# Patient Record
Sex: FEMALE | Race: WHITE | NOT HISPANIC OR LATINO | Employment: FULL TIME | URBAN - METROPOLITAN AREA
[De-identification: names, ages, dates, MRNs, and addresses within clinical notes are randomized per-mention and may not be internally consistent; named-entity substitution may affect disease eponyms.]

---

## 2018-03-25 ENCOUNTER — TRANSCRIBE ORDERS (OUTPATIENT)
Dept: ADMINISTRATIVE | Age: 63
End: 2018-03-25

## 2018-03-25 ENCOUNTER — APPOINTMENT (OUTPATIENT)
Dept: RADIOLOGY | Age: 63
End: 2018-03-25
Payer: COMMERCIAL

## 2018-03-25 DIAGNOSIS — Z01.818 PREOP EXAMINATION: Primary | ICD-10-CM

## 2018-03-25 DIAGNOSIS — Z01.818 PREOP EXAMINATION: ICD-10-CM

## 2018-03-25 PROCEDURE — 71046 X-RAY EXAM CHEST 2 VIEWS: CPT

## 2018-04-12 ENCOUNTER — HOSPITAL ENCOUNTER (OUTPATIENT)
Dept: NON INVASIVE DIAGNOSTICS | Facility: CLINIC | Age: 63
Discharge: HOME | End: 2018-04-12
Payer: COMMERCIAL

## 2018-04-12 DIAGNOSIS — Z01.818 PREOP EXAMINATION: ICD-10-CM

## 2018-04-12 PROCEDURE — 93306 TTE W/DOPPLER COMPLETE: CPT

## 2018-04-13 PROCEDURE — 93306 TTE W/DOPPLER COMPLETE: CPT | Performed by: INTERNAL MEDICINE

## 2021-03-01 DIAGNOSIS — Z23 ENCOUNTER FOR IMMUNIZATION: ICD-10-CM

## 2022-07-22 ENCOUNTER — EVALUATION (OUTPATIENT)
Dept: PHYSICAL THERAPY | Facility: OTHER | Age: 67
End: 2022-07-22
Payer: COMMERCIAL

## 2022-07-22 DIAGNOSIS — M79.606 PAIN OF LOWER EXTREMITY, UNSPECIFIED LATERALITY: ICD-10-CM

## 2022-07-22 PROCEDURE — 97161 PT EVAL LOW COMPLEX 20 MIN: CPT | Performed by: PHYSICAL THERAPIST

## 2022-07-22 PROCEDURE — 97110 THERAPEUTIC EXERCISES: CPT | Performed by: PHYSICAL THERAPIST

## 2022-07-22 NOTE — PROGRESS NOTES
PT Evaluation     Today's date: 2022  Patient name: Ez Ochoa  : 1955  MRN: [id number]  Referring provider: LOI Jansen  Dx:   Encounter Diagnosis     ICD-10-CM    1  Pain of lower extremity, unspecified laterality  M79 606 Ambulatory Referral to Physical Therapy                  Assessment  Assessment details: Ez Ochoa is a pleasant 77 y o  adult who presents with  B/L LE pain and tightness, she reported that after a std and walking gradually getting worst  This has started about 3 months ago  She likes to walk for exercise, some strength exercises  And some yoga  She has been trying to stretch the legs several tiems a week  No history of back pain  No joint pan mostly just stiffness  HEP issued and POC reviewed  Impairments: abnormal coordination, abnormal muscle firing, abnormal or restricted ROM, activity intolerance, impaired physical strength, lacks appropriate home exercise program, pain with function and poor body mechanics    Symptom irritability: moderateUnderstanding of Dx/Px/POC: good   Prognosis: good    Goals  Short Term:  Pt will report decreased levels of pain by at least 2 subjective ratings in 4 weeks  Pt will demonstrate improved ROM by at least 10 degrees in 4 weeks  Pt will demonstrate improved strength by 1/2 grade  Long Term:   Pt will be independent in their HEP in 8 weeks  Pt will be be pain free with IADL's  Pt will demonstrate improved FOTO, > 80         Plan  Plan details: Prognosis above is given PT services 2x/week tapering to 1x/week over the next 3 months and home program adherence    Patient would benefit from: skilled physical therapy  Planned modality interventions: thermotherapy: hydrocollator packs  Planned therapy interventions: activity modification, joint mobilization, manual therapy, motor coordination training, neuromuscular re-education, patient education, self care, therapeutic activities, therapeutic exercise, graded activity and home exercise program  Frequency: 2x week  Treatment plan discussed with: patient        Subjective Evaluation    Pain  At best pain ratin  At worst pain ratin  Location: stiffness rated     Patient Goals  Patient goals for therapy: decreased pain, independence with ADLs/IADLs, return to sport/leisure activities, increased motion and increased strength          Objective     General Comments:      Knee Comments  Special test                Hip/SI joint:   scour=   tightness     dennis =lemueltess      capsular mobility= -          long axis distraction (hip) =-         SLS=   -       obers=   -        piriformis test=-            P4 test=-             Gaenslen's test= -     Distraction=     -         Active SLR= -           Active SLR with stabilization =-            Leg length =     -          Sacral Thrust=-   anjel finger= - S/L si joint compression= -       LUMBAR tests:   SLR =   -slump= -PA mob= -    quadrant test=  traction=    -  prone instability test=     -          femoral nerve traction=  quad tightnesss   Repetitive testing: extension  =    mild  Stiff  flexion    = -  Lower extremity reflexes: -  Lower extremity myotomes:-  Sensation: no N/T     No changes bowl of bladder changes  no recent illness  ITB no TTP     Limited ROM B/ L hip and knee could be source of the stiffness                      Precautions: none       Manuals             IASTM B/L IT  NV                                                    Neuro Re-Ed             Cat camel              Quad alt LE              ER int owall              Bridge wit hER                                                     Ther Ex                                                                                                                     Ther Activity                                       Gait Training                                       Modalities

## 2022-07-22 NOTE — LETTER
2022    Guadalupe Fierro, [address] [facility] [address]    Patient: Faizan Tyson   YOB: 1955   Date of Visit: 2022     Encounter Diagnosis     ICD-10-CM    1  Pain of lower extremity, unspecified laterality  M79 606 Ambulatory Referral to Physical Therapy       Dear Dr Angel Coker:    Thank you for your recent referral of Faizan Tyson  Please review the attached evaluation summary from Magaly's recent visit  Please verify that you agree with the plan of care by signing the attached order  If you have any questions or concerns, please do not hesitate to call  I sincerely appreciate the opportunity to share in the care of one of your patients and hope to have another opportunity to work with you in the near future  Sincerely,    Terrence Huang, PT      Referring Provider:      I certify that I have read the below Plan of Care and certify the need for these services furnished under this plan of treatment while under my care  [address]upe Fierro [address] [facility] [address] [facility] [address]  [place] [place]          PT Evaluation     Today's date: 2022  Patient name: Faizan Tyson  : 1955  MRN: [id number]  Referring provider: LOI Fairbanks  Dx:   Encounter Diagnosis     ICD-10-CM    1  Pain of lower extremity, unspecified laterality  M79 606 Ambulatory Referral to Physical Therapy                  Assessment  Assessment details: Faizan Tyson is a pleasant 77 y o  adult who presents with  B/L LE pain and tightness, she reported that after a std and walking gradually getting worst  This has started about 3 months ago  She likes to walk for exercise, some strength exercises  And some yoga  She has been trying to stretch the legs several tiems a week  No history of back pain  No joint pan mostly just stiffness  HEP issued and POC reviewed       Impairments: abnormal coordination, abnormal muscle firing, abnormal or restricted ROM, activity intolerance, impaired physical strength, lacks appropriate home exercise program, pain with function and poor body mechanics    Symptom irritability: moderateUnderstanding of Dx/Px/POC: good   Prognosis: good    Goals  Short Term:  Pt will report decreased levels of pain by at least 2 subjective ratings in 4 weeks  Pt will demonstrate improved ROM by at least 10 degrees in 4 weeks  Pt will demonstrate improved strength by 1/2 grade  Long Term:   Pt will be independent in their HEP in 8 weeks  Pt will be be pain free with IADL's  Pt will demonstrate improved FOTO, > 80         Plan  Plan details: Prognosis above is given PT services 2x/week tapering to 1x/week over the next 3 months and home program adherence    Patient would benefit from: skilled physical therapy  Planned modality interventions: thermotherapy: hydrocollator packs  Planned therapy interventions: activity modification, joint mobilization, manual therapy, motor coordination training, neuromuscular re-education, patient education, self care, therapeutic activities, therapeutic exercise, graded activity and home exercise program  Frequency: 2x week  Treatment plan discussed with: patient        Subjective Evaluation    Pain  At best pain ratin  At worst pain ratin  Location: stiffness rated     Patient Goals  Patient goals for therapy: decreased pain, independence with ADLs/IADLs, return to sport/leisure activities, increased motion and increased strength          Objective     General Comments:      Knee Comments  Special test                Hip/SI joint:   scour=   tightness     dennis =tighntess      capsular mobility= -          long axis distraction (hip) =-         SLS=   -       obers=   -        piriformis test=-            P4 test=-             Gaenslen's test= -     Distraction=     -         Active SLR= -           Active SLR with stabilization =-            Leg length =     -          Sacral Thrust=-   anjel finger= - S/L si joint compression= -       LUMBAR tests:   SLR =   -slump= -PA mob= -    quadrant test=  traction=    -  prone instability test=     -          femoral nerve traction=  quad tightnesss   Repetitive testing: extension  =    mild  Stiff  flexion    = -  Lower extremity reflexes: -  Lower extremity myotomes:-  Sensation: no N/T     No changes bowl of bladder changes  no recent illness  ITB no TTP     Limited ROM B/ L hip and knee could be source of the stiffness                      Precautions: none       Manuals             IASTM B/L IT  NV                                                    Neuro Re-Ed             Cat camel              Quad alt LE              ER int owall              Bridge wit hER                                                     Ther Ex                                                                                                                     Ther Activity                                       Gait Training                                       Modalities

## 2022-08-05 ENCOUNTER — OFFICE VISIT (OUTPATIENT)
Dept: PHYSICAL THERAPY | Facility: OTHER | Age: 67
End: 2022-08-05
Payer: COMMERCIAL

## 2022-08-05 DIAGNOSIS — M79.606 PAIN OF LOWER EXTREMITY, UNSPECIFIED LATERALITY: Primary | ICD-10-CM

## 2022-08-05 PROCEDURE — 97110 THERAPEUTIC EXERCISES: CPT | Performed by: PHYSICAL THERAPIST

## 2022-08-05 PROCEDURE — 97140 MANUAL THERAPY 1/> REGIONS: CPT | Performed by: PHYSICAL THERAPIST

## 2022-08-05 PROCEDURE — 97112 NEUROMUSCULAR REEDUCATION: CPT | Performed by: PHYSICAL THERAPIST

## 2022-08-05 NOTE — PROGRESS NOTES
Daily Note     Today's date: 2022  Patient name: Pablo Recinos  : 1955  MRN: [id number]  Referring provider: LOI Gunter  Dx:   Encounter Diagnosis     ICD-10-CM    1  Pain of lower extremity, unspecified laterality  M79 606                   Subjective:  Progressing well minimal pain today, tolerated ISTM well  Objective: See treatment diary below      Assessment: Tolerated treatment well  Patient demonstrated fatigue post treatment       Plan: Continue per plan of care        Precautions: none       Manuals 8 5            IASTM B/L IT  MJ                                                    Neuro Re-Ed             Cat camel              Quad alt LE              TB std kicks  20 each             Bridge wit hER              RDL #5 to step 10x2             X band walk 3 laps             biodex balance  NV             Ther Ex             Bike  6min             gsatroc stretch  10''x10             Er into wall 5''x10                                                                              Ther Activity                                       Gait Training                                       Modalities

## 2022-08-19 ENCOUNTER — OFFICE VISIT (OUTPATIENT)
Dept: PHYSICAL THERAPY | Facility: OTHER | Age: 67
End: 2022-08-19
Payer: COMMERCIAL

## 2022-08-19 DIAGNOSIS — M79.606 PAIN OF LOWER EXTREMITY, UNSPECIFIED LATERALITY: Primary | ICD-10-CM

## 2022-08-19 PROCEDURE — 97140 MANUAL THERAPY 1/> REGIONS: CPT | Performed by: PHYSICAL THERAPIST

## 2022-08-19 PROCEDURE — 97112 NEUROMUSCULAR REEDUCATION: CPT | Performed by: PHYSICAL THERAPIST

## 2022-08-19 PROCEDURE — 97110 THERAPEUTIC EXERCISES: CPT | Performed by: PHYSICAL THERAPIST

## 2022-08-19 NOTE — PROGRESS NOTES
Daily Note     Today's date: 2022  Patient name: Lyric Dumont  : 1955  MRN: [id number]  Referring provider: LOI Cheng  Dx:   No diagnosis found  Subjective:   patient is progressing very well she is able to walk > 1 mile no cramping or pain  HEP was updated  We will tenitilvey D/C from PT at this time  Since we are meeting the majority of the goals set at this time  Objective: See treatment diary below      Assessment: Tolerated treatment well  Patient demonstrated fatigue post treatment       Plan: Continue per plan of care        Precautions: none       Manuals 8 5            IASTM B/L IT  MJ                                                    Neuro Re-Ed             Cat camel              Quad alt LE              TB std kicks  20 each             Bridge wit hER   With kick 10x2            RDL #5 to step 10x2             X band walk 3 laps             biodex balance  NV  Los 3x            Ther Ex             Bike  6min             gsatroc stretch  10''x10             Er into wall 5''x10                                                                              Ther Activity                                       Gait Training                                       Modalities

## 2024-06-21 ENCOUNTER — HOSPITAL ENCOUNTER (OUTPATIENT)
Facility: HOSPITAL | Age: 69
End: 2024-06-21
Payer: MEDICARE

## 2024-06-21 DIAGNOSIS — Z12.31 ENCOUNTER FOR SCREENING MAMMOGRAM FOR MALIGNANT NEOPLASM OF BREAST: ICD-10-CM

## 2024-06-21 PROCEDURE — 77067 SCR MAMMO BI INCL CAD: CPT

## 2024-06-21 PROCEDURE — 77063 BREAST TOMOSYNTHESIS BI: CPT

## 2024-08-22 ENCOUNTER — HOSPITAL ENCOUNTER (OUTPATIENT)
Dept: RADIOLOGY | Age: 69
Discharge: HOME | End: 2024-08-22
Payer: MEDICARE

## 2024-08-22 DIAGNOSIS — Z87.891 SMOKING HISTORY: ICD-10-CM

## 2024-08-22 PROCEDURE — 76706 US ABDL AORTA SCREEN AAA: CPT
